# Patient Record
Sex: MALE | Race: WHITE | NOT HISPANIC OR LATINO | Employment: OTHER | URBAN - METROPOLITAN AREA
[De-identification: names, ages, dates, MRNs, and addresses within clinical notes are randomized per-mention and may not be internally consistent; named-entity substitution may affect disease eponyms.]

---

## 2020-05-02 ENCOUNTER — HOSPITAL ENCOUNTER (EMERGENCY)
Facility: CLINIC | Age: 25
Discharge: HOME | End: 2020-05-02
Attending: EMERGENCY MEDICINE | Admitting: EMERGENCY MEDICINE
Payer: COMMERCIAL

## 2020-05-02 ENCOUNTER — APPOINTMENT (OUTPATIENT)
Dept: GENERAL RADIOLOGY | Facility: CLINIC | Age: 25
End: 2020-05-02
Attending: FAMILY MEDICINE
Payer: COMMERCIAL

## 2020-05-02 VITALS
BODY MASS INDEX: 27.07 KG/M2 | DIASTOLIC BLOOD PRESSURE: 95 MMHG | OXYGEN SATURATION: 98 % | TEMPERATURE: 98.4 F | WEIGHT: 160 LBS | RESPIRATION RATE: 18 BRPM | HEART RATE: 97 BPM | SYSTOLIC BLOOD PRESSURE: 135 MMHG

## 2020-05-02 DIAGNOSIS — M79.672 LEFT FOOT PAIN: ICD-10-CM

## 2020-05-02 PROCEDURE — 73630 X-RAY EXAM OF FOOT: CPT | Mod: TC,LT

## 2020-05-02 PROCEDURE — 99283 EMERGENCY DEPT VISIT LOW MDM: CPT | Performed by: EMERGENCY MEDICINE

## 2020-05-02 PROCEDURE — 99282 EMERGENCY DEPT VISIT SF MDM: CPT | Mod: Z6 | Performed by: EMERGENCY MEDICINE

## 2020-05-02 RX ORDER — IBUPROFEN 200 MG
600 TABLET ORAL EVERY 8 HOURS PRN
COMMUNITY

## 2020-05-02 NOTE — ED PROVIDER NOTES
History     Chief Complaint   Patient presents with     Foot Injury     HPI  Davion Agrawal is a 24 year old male who presents with injury to his left foot.  Work-related on Tuesday when he dropped a piece of steel across the dorsum of the left foot.  He estimates the weight of the still to be 80 to 100 pounds.  He is remained ambulatory.  Has had minimal swelling with no ecchymosis.  Did have a work boot on when it happened.  He presents requesting an x-ray to make sure that there is no fracture.    Allergies:  Allergies   Allergen Reactions     No Known Drug Allergies        Problem List:    Patient Active Problem List    Diagnosis Date Noted     Need for prophylactic vaccination and inoculation against influenza 08/03/2016     Priority: Medium     Hyperlipidemia with target LDL less than 130 12/23/2014     Priority: Medium     Diagnosis updated by automated process. Provider to review and confirm.       Generalized anxiety disorder 11/07/2013     Priority: Medium     Mild major depression (H) 11/07/2013     Priority: Medium        Past Medical History:    Past Medical History:   Diagnosis Date     ADHD (attention deficit hyperactivity disorder)      Allergic rhinitis, cause unspecified        Past Surgical History:    History reviewed. No pertinent surgical history.    Family History:    History reviewed. No pertinent family history.    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      Physical Exam   BP: (!) 135/95  Pulse: 97  Temp: 98.4  F (36.9  C)  Resp: 18  Weight: 72.6 kg (160 lb)  SpO2: 98 %      Physical Exam  Vitals signs and nursing note reviewed.   Eyes:      Conjunctiva/sclera: Conjunctivae normal.   Cardiovascular:      Rate and Rhythm: Normal rate.   Pulmonary:      Effort: Pulmonary effort is normal.   Musculoskeletal:      Comments: Left foot: No visible soft tissue swelling over the dorsum of the foot.  There is no ecchymosis, skin abrasions or break in the skin.  He is tender in the mid second third and fourth metatarsal.  No pain with movement of the toes.  CMS to the foot/lower extremity remains intact.  He is ambulatory.   Neurological:      Mental Status: He is alert.         ED Course        Procedures                   No results found for this or any previous visit (from the past 24 hour(s)).    Medications - No data to display    Assessments & Plan (with Medical Decision Making)  24-year-old male presents with a work-related injury.  Left foot injury on Tuesday when he dropped a piece of steel.  He is ambulatory still bearing weight but came in requesting x-ray to make sure there is no fracture.  Still having some tenderness.  His examination noted no soft tissue swelling or ecchymosis.  He was able to bear full weight.  There was no break in the skin.  CMS to the extremity was intact.  X-ray was personally reviewed and identified to have no evidence for fracture.  He was discharged home with instructions that he may return to work bear weight and participate in life activities as tolerated.  He can continue applying ice and or use of Tylenol and ibuprofen as needed for discomfort.  January     I have reviewed the nursing notes.    I have reviewed the findings, diagnosis, plan and need for follow up with the patient.      New Prescriptions    No medications on file       Final diagnoses:   Left foot pain       5/2/2020   [facility] EMERGENCY  DEPARTMENT     Maxim Christianson,   05/02/20 0728

## 2020-05-02 NOTE — DISCHARGE INSTRUCTIONS
X-ray fortunately identified no joint or bone injury.  Your discomfort is related to soft tissue after dropping a piece of steel on the foot.  Weightbearing activity as tolerated.  You can continue applying ice to help alleviate discomfort along with Tylenol or ibuprofen.

## 2022-01-01 ENCOUNTER — HOSPITAL ENCOUNTER (EMERGENCY)
Facility: CLINIC | Age: 27
Discharge: HOME | End: 2022-11-10
Attending: EMERGENCY MEDICINE | Admitting: EMERGENCY MEDICINE

## 2022-01-01 VITALS
TEMPERATURE: 98.3 F | HEIGHT: 67 IN | BODY MASS INDEX: 25.36 KG/M2 | WEIGHT: 161.6 LBS | HEART RATE: 82 BPM | DIASTOLIC BLOOD PRESSURE: 90 MMHG | OXYGEN SATURATION: 99 % | RESPIRATION RATE: 14 BRPM | SYSTOLIC BLOOD PRESSURE: 141 MMHG

## 2022-01-01 DIAGNOSIS — R10.9 BILATERAL FLANK PAIN: ICD-10-CM

## 2022-01-01 LAB
ALBUMIN SERPL-MCNC: 4.5 G/DL (ref 3.4–5)
ALBUMIN UR-MCNC: NEGATIVE MG/DL
ALP SERPL-CCNC: 53 U/L (ref 40–150)
ALT SERPL W P-5'-P-CCNC: 24 U/L (ref 0–70)
AMORPH CRY #/AREA URNS HPF: ABNORMAL /HPF
ANION GAP SERPL CALCULATED.3IONS-SCNC: 4 MMOL/L (ref 3–14)
APPEARANCE UR: ABNORMAL
AST SERPL W P-5'-P-CCNC: 17 U/L (ref 0–45)
BASOPHILS # BLD AUTO: 0.1 10E3/UL (ref 0–0.2)
BASOPHILS NFR BLD AUTO: 1 %
BILIRUB SERPL-MCNC: 0.5 MG/DL (ref 0.2–1.3)
BILIRUB UR QL STRIP: NEGATIVE
BUN SERPL-MCNC: 14 MG/DL (ref 7–30)
CALCIUM SERPL-MCNC: 8.6 MG/DL (ref 8.5–10.1)
CHLORIDE BLD-SCNC: 105 MMOL/L (ref 94–109)
CO2 SERPL-SCNC: 30 MMOL/L (ref 20–32)
COLOR UR AUTO: YELLOW
CREAT SERPL-MCNC: 0.82 MG/DL (ref 0.66–1.25)
EOSINOPHIL # BLD AUTO: 0.2 10E3/UL (ref 0–0.7)
EOSINOPHIL NFR BLD AUTO: 3 %
ERYTHROCYTE [DISTWIDTH] IN BLOOD BY AUTOMATED COUNT: 11.8 % (ref 10–15)
GFR SERPL CREATININE-BSD FRML MDRD: >90 ML/MIN/1.73M2
GLUCOSE BLD-MCNC: 93 MG/DL (ref 70–99)
GLUCOSE UR STRIP-MCNC: NEGATIVE MG/DL
HCT VFR BLD AUTO: 45.6 % (ref 40–53)
HGB BLD-MCNC: 16.2 G/DL (ref 13.3–17.7)
HGB UR QL STRIP: NEGATIVE
IMM GRANULOCYTES # BLD: 0 10E3/UL
IMM GRANULOCYTES NFR BLD: 0 %
KETONES UR STRIP-MCNC: NEGATIVE MG/DL
LEUKOCYTE ESTERASE UR QL STRIP: NEGATIVE
LIPASE SERPL-CCNC: 112 U/L (ref 73–393)
LYMPHOCYTES # BLD AUTO: 2.5 10E3/UL (ref 0.8–5.3)
LYMPHOCYTES NFR BLD AUTO: 35 %
MCH RBC QN AUTO: 33.8 PG (ref 26.5–33)
MCHC RBC AUTO-ENTMCNC: 35.5 G/DL (ref 31.5–36.5)
MCV RBC AUTO: 95 FL (ref 78–100)
MONOCYTES # BLD AUTO: 0.7 10E3/UL (ref 0–1.3)
MONOCYTES NFR BLD AUTO: 10 %
MUCOUS THREADS #/AREA URNS LPF: PRESENT /LPF
NEUTROPHILS # BLD AUTO: 3.7 10E3/UL (ref 1.6–8.3)
NEUTROPHILS NFR BLD AUTO: 51 %
NITRATE UR QL: NEGATIVE
NRBC # BLD AUTO: 0 10E3/UL
NRBC BLD AUTO-RTO: 0 /100
PH UR STRIP: 7 [PH] (ref 5–7)
PLATELET # BLD AUTO: 315 10E3/UL (ref 150–450)
POTASSIUM BLD-SCNC: 4 MMOL/L (ref 3.4–5.3)
PROT SERPL-MCNC: 7.3 G/DL (ref 6.8–8.8)
RBC # BLD AUTO: 4.8 10E6/UL (ref 4.4–5.9)
RBC URINE: <1 /HPF
SODIUM SERPL-SCNC: 139 MMOL/L (ref 133–144)
SP GR UR STRIP: 1.02 (ref 1–1.03)
UROBILINOGEN UR STRIP-MCNC: 4 MG/DL
WBC # BLD AUTO: 7.2 10E3/UL (ref 4–11)
WBC URINE: 2 /HPF

## 2022-01-01 PROCEDURE — 85014 HEMATOCRIT: CPT | Performed by: EMERGENCY MEDICINE

## 2022-01-01 PROCEDURE — 81001 URINALYSIS AUTO W/SCOPE: CPT | Performed by: EMERGENCY MEDICINE

## 2022-01-01 PROCEDURE — 80053 COMPREHEN METABOLIC PANEL: CPT | Performed by: EMERGENCY MEDICINE

## 2022-01-01 PROCEDURE — 99283 EMERGENCY DEPT VISIT LOW MDM: CPT | Performed by: EMERGENCY MEDICINE

## 2022-01-01 PROCEDURE — 83690 ASSAY OF LIPASE: CPT | Performed by: EMERGENCY MEDICINE

## 2022-01-01 PROCEDURE — 82040 ASSAY OF SERUM ALBUMIN: CPT | Performed by: EMERGENCY MEDICINE

## 2022-01-01 PROCEDURE — 36415 COLL VENOUS BLD VENIPUNCTURE: CPT | Performed by: EMERGENCY MEDICINE

## 2022-01-01 RX ORDER — TRAZODONE HYDROCHLORIDE 50 MG/1
50-100 TABLET, FILM COATED ORAL PRN
COMMUNITY
Start: 2021-03-18

## 2022-01-01 ASSESSMENT — ACTIVITIES OF DAILY LIVING (ADL)
ADLS_ACUITY_SCORE: 35
ADLS_ACUITY_SCORE: 35

## 2022-11-10 NOTE — ED PROVIDER NOTES
History     Chief Complaint   Patient presents with     Flank Pain     HPI  Davion Agrawal is a 27 year old male who presents to the ER secondary to bilateral flank pain which started a month ago.  It has progressively worsened.  The Finkley pain is located in both the right and lower flank.  He is not necessarily worse to twist or turn and bend at the waist.  He is a .  He has noticed that sometimes difficult to stand up.  He is also noticed that he is urinating more frequently than usual and is frequently dark.  He is also had some black tarry colored stools intermittently.  No significant epigastric discomfort.  No dysuria.  He has noticed intermittent hematuria.  He also states that he has had some unintentional weight loss of approximately 10 pounds and lack of appetite in the last month.  He is also had some night sweats and hot flashes.  No chest pain or palpitations.  He has had frequent dizziness with standing up.  He was more winded when he got to the top of the steps at his worksite recently which is unusual for him.  He has not had any syncope.  When he was 17 he had some sort of kidney failure that are monitoring closely and there was concern for autoimmune kidney failure.  It seemed to resolve on its own.  He does not recall the details of it.  He does know that kidney cancer runs in his family.  He does not have a primary care provider as he has been moving around fairly recently.  He was in [place] and he is settled back here but has not been able to obtain a primary care provider yet.    Allergies:  Allergies   Allergen Reactions     No Known Drug Allergies        Problem List:    Patient Active Problem List    Diagnosis Date Noted     Need for prophylactic vaccination and inoculation against influenza 08/03/2016     Priority: Medium     Hyperlipidemia with target LDL less than 130 12/23/2014     Priority: Medium     Diagnosis updated by automated process. Provider to review and  "confirm.       Generalized anxiety disorder 11/07/2013     Priority: Medium     Mild major depression (H) 11/07/2013     Priority: Medium        Past Medical History:    Past Medical History:   Diagnosis Date     ADHD (attention deficit hyperactivity disorder)      Allergic rhinitis, cause unspecified      Chronic kidney disease        Past Surgical History:    History reviewed. No pertinent surgical history.    Family History:    History reviewed. No pertinent family history.    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      Physical Exam   BP: (!) 141/90  Pulse: 82  Temp: 98.3  F (36.8  C)  Resp: 14  Height: 170.2 cm (5' 7\")  Weight: 73.3 kg (161 lb 9.6 oz)  SpO2: 99 %      Physical Exam  Vitals and nursing note reviewed.   Constitutional:       General: He is not in acute distress.     Appearance: Normal appearance. He is well-developed. He is not diaphoretic.   HENT:      Head: Normocephalic and atraumatic.      Right Ear: External ear normal.      Left Ear: External ear normal.      Nose: Nose normal.      Mouth/Throat:      Mouth: Mucous membranes are moist.   Eyes:      General: No scleral icterus.  Cardiovascular:      Rate and Rhythm: Normal rate and regular rhythm.   Pulmonary:      Effort: Pulmonary effort is normal.      " Breath sounds: Normal breath sounds.   Abdominal:      Tenderness: There is no abdominal tenderness. There is no guarding or rebound.   Genitourinary:     Comments: Bilateral CVA tenderness.  No tenderness palpation over the muscles in the lumbar region.  Musculoskeletal:         General: Normal range of motion.      Cervical back: Normal range of motion and neck supple.   Skin:     General: Skin is warm and dry.      Findings: No rash.   Neurological:      General: No focal deficit present.      Mental Status: He is alert and oriented to person, place, and time.   Psychiatric:         Mood and Affect: Mood normal.         ED Course                 Procedures           Results for orders placed or performed during the hospital encounter of 11/10/22 (from the past 24 hour(s))   UA with Microscopic reflex to Culture    Specimen: Urine, Midstream   Result Value Ref Range    Color Urine Yellow Colorless, Straw, Light Yellow, Yellow    Appearance Urine Cloudy (A) Clear    Glucose Urine Negative Negative mg/dL    Bilirubin Urine Negative Negative    Ketones Urine Negative Negative mg/dL    Specific Gravity Urine 1.023 1.003 - 1.035    Blood Urine Negative Negative    pH Urine 7.0 5.0 - 7.0    Protein Albumin Urine Negative Negative mg/dL    Urobilinogen Urine 4.0 (A) Normal, 2.0 mg/dL    Nitrite Urine Negative Negative    Leukocyte Esterase Urine Negative Negative    Mucus Urine Present (A) None Seen /LPF    Amorphous Crystals Urine Few (A) None Seen /HPF    RBC Urine <1 <=2 /HPF    WBC Urine 2 <=5 /HPF    Narrative    Urine Culture not indicated   CBC with platelets differential    Narrative    The following orders were created for panel order CBC with platelets differential.  Procedure                               Abnormality         Status                     ---------                               -----------         ------                     CBC with platelets and d...[[id number]]  Abnormal            Final result                  Please view results for these tests on the individual orders.   Comprehensive metabolic panel   Result Value Ref Range    Sodium 139 133 - 144 mmol/L    Potassium 4.0 3.4 - 5.3 mmol/L    Chloride 105 94 - 109 mmol/L    Carbon Dioxide (CO2) 30 20 - 32 mmol/L    Anion Gap 4 3 - 14 mmol/L    Urea Nitrogen 14 7 - 30 mg/dL    Creatinine 0.82 0.66 - 1.25 mg/dL    Calcium 8.6 8.5 - 10.1 mg/dL    Glucose 93 70 - 99 mg/dL    Alkaline Phosphatase 53 40 - 150 U/L    AST 17 0 - 45 U/L    ALT 24 0 - 70 U/L    Protein Total 7.3 6.8 - 8.8 g/dL    Albumin 4.5 3.4 - 5.0 g/dL    Bilirubin Total 0.5 0.2 - 1.3 mg/dL    GFR Estimate >90 >60 mL/min/1.73m2   Lipase   Result Value Ref Range    Lipase 112 73 - 393 U/L   CBC with platelets and differential   Result Value Ref Range    WBC Count 7.2 4.0 - 11.0 10e3/uL    RBC Count 4.80 4.40 - 5.90 10e6/uL    Hemoglobin 16.2 13.3 - 17.7 g/dL    Hematocrit 45.6 40.0 - 53.0 %    MCV 95 78 - 100 fL    MCH 33.8 (H) 26.5 - 33.0 pg    MCHC 35.5 31.5 - 36.5 g/dL    RDW 11.8 10.0 - 15.0 %    Platelet Count 315 150 - 450 10e3/uL    % Neutrophils 51 %    % Lymphocytes 35 %    % Monocytes 10 %    % Eosinophils 3 %    % Basophils 1 %    % Immature Granulocytes 0 %    NRBCs per 100 WBC 0 <1 /100    Absolute Neutrophils 3.7 1.6 - 8.3 10e3/uL    Absolute Lymphocytes 2.5 0.8 - 5.3 10e3/uL    Absolute Monocytes 0.7 0.0 - 1.3 10e3/uL    Absolute Eosinophils 0.2 0.0 - 0.7 10e3/uL    Absolute Basophils 0.1 0.0 - 0.2 10e3/uL    Absolute Immature Granulocytes 0.0 <=0.4 10e3/uL    Absolute NRBCs 0.0 10e3/uL       Medications - No data to display    Assessments & Plan (with Medical Decision Making)  Bilateral flank pain, dizziness, increased urinary frequency.  This is subacute.  He does not have a primary care provider so has not been following up with anyone.  He is concerned because of a family history of kidney cancer.  Vital signs are noted.  Slight hypertension, normal heart rate, afebrile, O2 sat  of 98%.  He appears well.  This could be just musculoskeletal back pain although he is complaining of night sweats hot flashes and unintentional weight loss.  He did have some intermittent melena which could represent peptic ulcer disease and be contributing to his dizziness and melanotic stools.  We will start with basic blood work, urinalysis and then reevaluate.  Labs including urinalysis, CBC, competence of metabolic panel are all fairly unremarkable.  This is somewhat chronic in nature.  Patient declined pain medications for home.  I think he stable for outpatient follow-up.  I have asked him to make an appointment and establish care with primary care.  He may need imaging such as a CT scan or spine MRI.  Vital signs are reassuring.  Return to ER precautions and fall precautions discussed.  All questions answered prior to discharge.     I have reviewed the nursing notes.    I have reviewed the findings, diagnosis, plan and need for follow up with the patient.       Discharge Medication List as of 11/10/2022  7:47 PM          Final diagnoses:   Bilateral flank pain       11/10/2022   [facility] EMERGENCY DEPT     Maxim Burns MD  11/10/22 1955

## 2022-11-10 NOTE — ED TRIAGE NOTES
"Pt states that he started to have bilateral flank pain starting about a month ago. Has progressively gotten worse.  Pt has history of kidney failure about \"10-12 years ago and they couldn't figure out why my kidneys were not working properly\", has family history.   F/U without any \"real issues, have had pain on and off over the years but the DrSharon States I am okay.\"  Pt states he drinks about a gallon of water a day and his urine is juan manuel and dark looking. States that the pain has never gone this long and this intense.  Alert and oriented.       Triage Assessment     Row Name 11/10/22 [id number]       Triage Assessment (Adult)    Airway WDL WDL       Respiratory WDL    Respiratory WDL WDL       Cardiac WDL    Cardiac WDL WDL       Cognitive/Neuro/Behavioral WDL    Cognitive/Neuro/Behavioral WDL WDL              "

## 2022-11-11 NOTE — DISCHARGE INSTRUCTIONS
Fortunately your labs look good.  If you continue to have intermittent dark stools please take Prilosec OTC.  There was no evidence for kidney failure today or kidney infection.  Drink plenty of water.  Follow-up in the clinic and if symptoms persist you may need imaging such as a CT scan or MRI.  I am not sure if the pain is related to your back or related to something more internal.  Return to the ER if new or worsening symptoms.

## 2023-01-01 ENCOUNTER — HOSPITAL ENCOUNTER (EMERGENCY)
Facility: CLINIC | Age: 28
Discharge: HOME | End: 2023-03-03
Attending: FAMILY MEDICINE | Admitting: FAMILY MEDICINE

## 2023-01-01 VITALS
DIASTOLIC BLOOD PRESSURE: 80 MMHG | WEIGHT: 161 LBS | SYSTOLIC BLOOD PRESSURE: 130 MMHG | TEMPERATURE: 98.5 F | HEART RATE: 96 BPM | OXYGEN SATURATION: 96 % | BODY MASS INDEX: 25.22 KG/M2 | RESPIRATION RATE: 18 BRPM

## 2023-01-01 DIAGNOSIS — T50.903A: ICD-10-CM

## 2023-01-01 DIAGNOSIS — F10.920 ALCOHOL INTOXICATION, UNCOMPLICATED (H): ICD-10-CM

## 2023-01-01 LAB
ALBUMIN SERPL BCG-MCNC: 4.7 G/DL (ref 3.5–5.2)
ALP SERPL-CCNC: 67 U/L (ref 40–129)
ALT SERPL W P-5'-P-CCNC: 52 U/L (ref 10–50)
AMPHETAMINES UR QL: DETECTED
ANION GAP SERPL CALCULATED.3IONS-SCNC: 11 MMOL/L (ref 7–15)
AST SERPL W P-5'-P-CCNC: 35 U/L (ref 10–50)
BARBITURATES UR QL SCN: NOT DETECTED
BASOPHILS # BLD AUTO: 0.1 10E3/UL (ref 0–0.2)
BASOPHILS NFR BLD AUTO: 1 %
BENZODIAZ UR QL SCN: NOT DETECTED
BILIRUB SERPL-MCNC: 0.5 MG/DL
BUN SERPL-MCNC: 5.5 MG/DL (ref 6–20)
BUPRENORPHINE UR QL: NOT DETECTED
CALCIUM SERPL-MCNC: 9.1 MG/DL (ref 8.6–10)
CANNABINOIDS UR QL: NOT DETECTED
CHLORIDE SERPL-SCNC: 100 MMOL/L (ref 98–107)
COCAINE UR QL SCN: NOT DETECTED
CREAT SERPL-MCNC: 0.94 MG/DL (ref 0.67–1.17)
D-METHAMPHET UR QL: DETECTED
DEPRECATED HCO3 PLAS-SCNC: 28 MMOL/L (ref 22–29)
EOSINOPHIL # BLD AUTO: 0.1 10E3/UL (ref 0–0.7)
EOSINOPHIL NFR BLD AUTO: 1 %
ERYTHROCYTE [DISTWIDTH] IN BLOOD BY AUTOMATED COUNT: 11.5 % (ref 10–15)
ETHANOL SERPL-MCNC: 0.09 G/DL
FLUAV RNA SPEC QL NAA+PROBE: NEGATIVE
FLUBV RNA RESP QL NAA+PROBE: NEGATIVE
GFR SERPL CREATININE-BSD FRML MDRD: >90 ML/MIN/1.73M2
GLUCOSE SERPL-MCNC: 103 MG/DL (ref 70–99)
HCT VFR BLD AUTO: 45.3 % (ref 40–53)
HGB BLD-MCNC: 15.9 G/DL (ref 13.3–17.7)
IMM GRANULOCYTES # BLD: 0 10E3/UL
IMM GRANULOCYTES NFR BLD: 0 %
LACTATE SERPL-SCNC: 2 MMOL/L (ref 0.7–2)
LYMPHOCYTES # BLD AUTO: 1.4 10E3/UL (ref 0.8–5.3)
LYMPHOCYTES NFR BLD AUTO: 20 %
MCH RBC QN AUTO: 33.8 PG (ref 26.5–33)
MCHC RBC AUTO-ENTMCNC: 35.1 G/DL (ref 31.5–36.5)
MCV RBC AUTO: 96 FL (ref 78–100)
METHADONE UR QL SCN: NOT DETECTED
MONOCYTES # BLD AUTO: 0.5 10E3/UL (ref 0–1.3)
MONOCYTES NFR BLD AUTO: 7 %
NEUTROPHILS # BLD AUTO: 5 10E3/UL (ref 1.6–8.3)
NEUTROPHILS NFR BLD AUTO: 71 %
NRBC # BLD AUTO: 0 10E3/UL
NRBC BLD AUTO-RTO: 0 /100
OPIATES UR QL SCN: NOT DETECTED
OXYCODONE UR QL SCN: NOT DETECTED
PCP UR QL SCN: NOT DETECTED
PLATELET # BLD AUTO: 329 10E3/UL (ref 150–450)
POTASSIUM SERPL-SCNC: 3.9 MMOL/L (ref 3.4–5.3)
PROPOXYPH UR QL: NOT DETECTED
PROT SERPL-MCNC: 7.4 G/DL (ref 6.4–8.3)
RBC # BLD AUTO: 4.71 10E6/UL (ref 4.4–5.9)
RSV RNA SPEC NAA+PROBE: NEGATIVE
SARS-COV-2 RNA RESP QL NAA+PROBE: NEGATIVE
SODIUM SERPL-SCNC: 139 MMOL/L (ref 136–145)
TRICYCLICS UR QL SCN: NOT DETECTED
TROPONIN T SERPL HS-MCNC: <6 NG/L
WBC # BLD AUTO: 7 10E3/UL (ref 4–11)

## 2023-01-01 PROCEDURE — 82077 ASSAY SPEC XCP UR&BREATH IA: CPT | Performed by: FAMILY MEDICINE

## 2023-01-01 PROCEDURE — 93005 ELECTROCARDIOGRAM TRACING: CPT | Performed by: FAMILY MEDICINE

## 2023-01-01 PROCEDURE — 85025 COMPLETE CBC W/AUTO DIFF WBC: CPT | Performed by: FAMILY MEDICINE

## 2023-01-01 PROCEDURE — 80053 COMPREHEN METABOLIC PANEL: CPT | Performed by: FAMILY MEDICINE

## 2023-01-01 PROCEDURE — 250N000011 HC RX IP 250 OP 636: Performed by: FAMILY MEDICINE

## 2023-01-01 PROCEDURE — 80306 DRUG TEST PRSMV INSTRMNT: CPT | Performed by: FAMILY MEDICINE

## 2023-01-01 PROCEDURE — 99284 EMERGENCY DEPT VISIT MOD MDM: CPT | Mod: 25 | Performed by: FAMILY MEDICINE

## 2023-01-01 PROCEDURE — 96375 TX/PRO/DX INJ NEW DRUG ADDON: CPT | Performed by: FAMILY MEDICINE

## 2023-01-01 PROCEDURE — 83605 ASSAY OF LACTIC ACID: CPT | Performed by: FAMILY MEDICINE

## 2023-01-01 PROCEDURE — 87637 SARSCOV2&INF A&B&RSV AMP PRB: CPT | Performed by: FAMILY MEDICINE

## 2023-01-01 PROCEDURE — C9803 HOPD COVID-19 SPEC COLLECT: HCPCS | Performed by: FAMILY MEDICINE

## 2023-01-01 PROCEDURE — 93010 ELECTROCARDIOGRAM REPORT: CPT | Performed by: FAMILY MEDICINE

## 2023-01-01 PROCEDURE — 96374 THER/PROPH/DIAG INJ IV PUSH: CPT | Performed by: FAMILY MEDICINE

## 2023-01-01 PROCEDURE — 36415 COLL VENOUS BLD VENIPUNCTURE: CPT | Performed by: FAMILY MEDICINE

## 2023-01-01 PROCEDURE — 99284 EMERGENCY DEPT VISIT MOD MDM: CPT | Mod: 25,CS | Performed by: FAMILY MEDICINE

## 2023-01-01 PROCEDURE — 96361 HYDRATE IV INFUSION ADD-ON: CPT | Performed by: FAMILY MEDICINE

## 2023-01-01 PROCEDURE — 258N000003 HC RX IP 258 OP 636: Performed by: FAMILY MEDICINE

## 2023-01-01 PROCEDURE — 84484 ASSAY OF TROPONIN QUANT: CPT | Performed by: FAMILY MEDICINE

## 2023-01-01 RX ORDER — ONDANSETRON 2 MG/ML
4 INJECTION INTRAMUSCULAR; INTRAVENOUS EVERY 30 MIN PRN
Status: DISCONTINUED | OUTPATIENT
Start: 2023-01-01 | End: 2023-01-01 | Stop reason: HOSPADM

## 2023-01-01 RX ORDER — SODIUM CHLORIDE 9 MG/ML
INJECTION, SOLUTION INTRAVENOUS CONTINUOUS
Status: DISCONTINUED | OUTPATIENT
Start: 2023-01-01 | End: 2023-01-01 | Stop reason: HOSPADM

## 2023-01-01 RX ORDER — LORAZEPAM 2 MG/ML
1 INJECTION INTRAMUSCULAR ONCE
Status: COMPLETED | OUTPATIENT
Start: 2023-01-01 | End: 2023-01-01

## 2023-01-01 RX ADMIN — SODIUM CHLORIDE 1000 ML: 9 INJECTION, SOLUTION INTRAVENOUS at 03:04

## 2023-01-01 RX ADMIN — ONDANSETRON 4 MG: 2 INJECTION INTRAMUSCULAR; INTRAVENOUS at 03:04

## 2023-01-01 RX ADMIN — LORAZEPAM 1 MG: 2 INJECTION INTRAMUSCULAR; INTRAVENOUS at 03:04

## 2023-01-01 ASSESSMENT — ACTIVITIES OF DAILY LIVING (ADL): ADLS_ACUITY_SCORE: 35
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
## 2023-03-03 NOTE — ED NOTES
Patient also adds that his girlfriend has been sick with a cold for a few days now, wonders if this is related.
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
## 2023-03-03 NOTE — ED TRIAGE NOTES
"Patient presents with chills/sweats and generalized weakness that started this evening after he went out to dinner. States he feels like \"this isn't just a sickness, it feels like a panic attack and I know something is wrong\". Endorses chest pressure that started after the previous symptoms. Is anxious in triage.     Triage Assessment     Row Name 03/03/23 0227       Triage Assessment (Adult)    Airway WDL WDL       Respiratory WDL    Respiratory WDL WDL       Skin Circulation/Temperature WDL    Skin Circulation/Temperature WDL WDL       Cardiac WDL    Cardiac WDL X;rhythm    Pulse Rate & Regularity tachycardic       Peripheral/Neurovascular WDL    Peripheral Neurovascular WDL WDL       Cognitive/Neuro/Behavioral WDL    Cognitive/Neuro/Behavioral WDL X;mood/behavior    Mood/Behavior anxious       Krysta Coma Scale    Best Eye Response 4-->(E4) spontaneous    Best Motor Response 6-->(M6) obeys commands    Best Verbal Response 5-->(V5) oriented    Krysta Coma Scale Score 15              "